# Patient Record
(demographics unavailable — no encounter records)

---

## 2024-12-30 NOTE — REVIEW OF SYSTEMS
[Joint Pain] : joint pain [Joint Stiffness] : joint stiffness [Joint Swelling] : joint swelling [Muscle Weakness] : muscle weakness [Back Pain] : back pain [Dizziness] : dizziness [Negative] : Heme/Lymph

## 2025-01-06 NOTE — PHYSICAL EXAM
[de-identified] : Telehealth precludes traditional, comprehensive physical exam. Patient appeared stable and alert.

## 2025-01-06 NOTE — HEALTH RISK ASSESSMENT
[No] : In the past 12 months have you used drugs other than those required for medical reasons? No [No falls in past year] : Patient reported no falls in the past year [Assistive Device] : Patient uses an assistive device [0] : 2) Feeling down, depressed, or hopeless: Not at all (0) [PHQ-2 Negative - No further assessment needed] : PHQ-2 Negative - No further assessment needed [With Patient/Caregiver] : , with patient/caregiver [Never] : Never [de-identified] : limited [de-identified] : ok [de-identified] : cane [[id number]] : 0 [AdvancecareDate] : 12/24 [FreeTextEntry4] : Discussed the importance of having a healthcare proxy to ensure that medical decisions are made according to the patient's wishes if they are unable to communicate. Encouraged patient to follow up with their primary care provider at the next visit to discuss this further. Patient can complete the form at the PCP office or download it from the [place] website.

## 2025-01-06 NOTE — ASSESSMENT
[FreeTextEntry1] : Educated about importance of healthy diet, physical activity, proper sleep (8 hours of sleep), importance of screening test for early detection and treatment of cancer. Importance of immunizations including COVID-19 and seasonal flu vaccine in order to prevent or lessen disease.  Patient encounter incorporated clinical review of the medical record including consultation from specialists, review of lab and diagnostic testing with interpretation and discussion of results with patient and/or primary caretaker, general patient counseling and coordination of care as well documentation update within the electronic medical record. All patient questions/concerns addressed during time of visit.   Time Based Billing: I have spent 25 minutes of time on the encounter.

## 2025-01-06 NOTE — HISTORY OF PRESENT ILLNESS
[Home] : at home, [unfilled] , at the time of the visit. [Other Location: e.g. Home (Enter Location, City,State)___] : at [unfilled] [Verbal consent obtained from patient] : the patient, [unfilled] [de-identified] : [facility] quality initiative provider note: 58-year-old female with history of multiple medical issues. Denies hospitalization, surgery, or new medical condition including COVID in the past year. Patient is being seen today to confirm diagnoses reported from [facility]. Patient report doing well. No complaints offered at this time. Awake, alert and oriented x4, in no acute distress. Denies any chest pain, shortness of breath, palpitation or dizziness. Patient verified medications and medical diagnosis. Report taking medications as prescribed.   There are no records to corroborate the diagnosis, and the patient denies any history of atherosclerosis of the native arteries of the extremities or morbid obesity. Normal DUSTIN, BLE arterial ultrasound report negative and current BMI 29.7   BS: 119 fasting A1c: 6.6 on 9/23/24

## 2025-01-06 NOTE — HISTORY OF PRESENT ILLNESS
[Home] : at home, [unfilled] , at the time of the visit. [Other Location: e.g. Home (Enter Location, City,State)___] : at [unfilled] [Verbal consent obtained from patient] : the patient, [unfilled] [de-identified] : [facility] quality initiative provider note: 58-year-old female with history of multiple medical issues. Denies hospitalization, surgery, or new medical condition including COVID in the past year. Patient is being seen today to confirm diagnoses reported from [facility]. Patient report doing well. No complaints offered at this time. Awake, alert and oriented x4, in no acute distress. Denies any chest pain, shortness of breath, palpitation or dizziness. Patient verified medications and medical diagnosis. Report taking medications as prescribed.   There are no records to corroborate the diagnosis, and the patient denies any history of atherosclerosis of the native arteries of the extremities or morbid obesity. Normal DUSTIN, BLE arterial ultrasound report negative and current BMI 29.7   BS: 119 fasting A1c: 6.6 on 9/23/24

## 2025-01-06 NOTE — HEALTH RISK ASSESSMENT
[No] : In the past 12 months have you used drugs other than those required for medical reasons? No [No falls in past year] : Patient reported no falls in the past year [Assistive Device] : Patient uses an assistive device [0] : 2) Feeling down, depressed, or hopeless: Not at all (0) [PHQ-2 Negative - No further assessment needed] : PHQ-2 Negative - No further assessment needed [With Patient/Caregiver] : , with patient/caregiver [Never] : Never [de-identified] : limited [de-identified] : ok [de-identified] : cane [[id number]] : 0 [AdvancecareDate] : 12/24 [FreeTextEntry4] : Discussed the importance of having a healthcare proxy to ensure that medical decisions are made according to the patient's wishes if they are unable to communicate. Encouraged patient to follow up with their primary care provider at the next visit to discuss this further. Patient can complete the form at the PCP office or download it from the [place] website.

## 2025-01-06 NOTE — PHYSICAL EXAM
[de-identified] : Telehealth precludes traditional, comprehensive physical exam. Patient appeared stable and alert.

## 2025-01-13 NOTE — HISTORY OF PRESENT ILLNESS
[FreeTextEntry1] : follow up  [de-identified] : 57 yo f presents to repeat labs and refills.  Here for bp check as well.  Eating well and trying to exercise more.  mentioned stressed and anxious, interested in her options  her daughter is having another baby, that will add to her stress levels and she worries about her daughter as well

## 2025-01-13 NOTE — PLAN
[FreeTextEntry1] : follow up labs, labs drawn in office   bp  stable   cont meds  cont with low salt diet and exercise  high chol, type 2 diabetes  follow up labs, drawn in office  cont meds  reviewed previous labs cont with low fat diet, low sugar/ carb diet, exercise  connecting with endo and cardio   anxiety, stress at home  connecting patient with behavioral health for further discussion   breast itch  cream has worked for patient before, will refill  if no resolution, rtc

## 2025-01-13 NOTE — HEALTH RISK ASSESSMENT
[0] : 2) Feeling down, depressed, or hopeless: Not at all (0) [PHQ-2 Negative - No further assessment needed] : PHQ-2 Negative - No further assessment needed [[id number]] : 0 [Never] : Never

## 2025-02-10 NOTE — DISCUSSION/SUMMARY
[FreeTextEntry1] : Possible abnormal rhythm check holter monitor if able to approve and schedule. CP check echo if able to approve and schedule. EKG section of the chart --- secondary to symptoms above an electrocardiogram also known as an EKG was performed.  Risks and benefits discussed with the patient. Patient was given time and privacy to changed into a gown. Shortly after, standard 10 leads were applied and a [employer] system was used to perform the study. The results were subsequently reviewed by attending physician and discussed with the patient. The study showed a normal sinus rhythm and no ST-T suggestive of ischemia. Order for the EKG was placed in the chart. The results were documented. Billing submitted. HTN we discussed inc norvasc or losartan, she feels that the increase is not warranted. Emotional support provided. HLD change to crestor, if willing to try  [EKG obtained to assist in diagnosis and management of assessed problem(s)] : EKG obtained to assist in diagnosis and management of assessed problem(s)

## 2025-02-10 NOTE — REASON FOR VISIT
[Symptom and Test Evaluation] : symptom and test evaluation [FreeTextEntry1] : 59F comes in for a follow up. Patient is hypertensive on arrival. She remarks on issues with atorvastatin, such that she thinks that the medications is causing her to have unpleasant breath.  She denied palpitations, but her blood pressure machine indicated irregular heart beat.

## 2025-02-17 NOTE — REVIEW OF SYSTEMS
[Joint Pain] : joint pain [Joint Stiffness] : joint stiffness [Joint Swelling] : joint swelling [Back Pain] : back pain [Negative] : Heme/Lymph [FreeTextEntry9] : chronic pain [de-identified] : numbness and tingling BLE

## 2025-02-17 NOTE — HISTORY OF PRESENT ILLNESS
[Home] : at home, [unfilled] , at the time of the visit. [Other Location: e.g. Home (Enter Location, City,State)___] : at [unfilled] [Telehealth (audio & video)] : This visit was provided via telehealth using real-time 2-way audio visual technology. [Verbal consent obtained from patient] : the patient, [unfilled] [de-identified] : [facility] quality initiative provider note: 59-year-old female with history of multiple medical issues. Denies hospitalization, surgery, or new medical condition including COVID in the past year. Patient is being seen today to confirm diagnoses reported from [facility]. Patient report doing well. No complaints offered at this time. Awake, alert and oriented x4, in no acute distress. Denies any chest pain, shortness of breath, palpitation or dizziness. Patient verified medications and medical diagnosis. Report taking medications as prescribed. Patient experiencing numbness and tingling BLE. She states that taking vitamin B12 seemed to provide some relief, but she was advised by her doctor to discontinue the supplement. Patient reports chronic back pain, for which she claims to be disabled. She has rods in her back and is refuse any pain medications. She is seeking help in locating a gym with a pool that is covered by insurance to aid her exercise and rehabilitation   There are no records to corroborate the diagnosis, and the patient denies any history of atherosclerosis of the native arteries of the extremities or morbid obesity. Normal DUSTIN, BLE arterial ultrasound report negative and current BMI 29.38   BS: 120 fasting A1c: 6.8 on 1/14/25

## 2025-02-17 NOTE — ASSESSMENT
[FreeTextEntry1] : Referral to [employer] for . The [employer] outreach team will follow up with patient.   Educated about importance of healthy diet, physical activity, proper sleep (8 hours of sleep), importance of screening test for early detection and treatment of cancer. Importance of immunizations including COVID-19 and seasonal flu vaccine in order to prevent or lessen disease.  Patient encounter incorporated clinical review of the medical record including consultation from specialists, review of lab and diagnostic testing with interpretation and discussion of results with patient and/or primary caretaker, general patient counseling and coordination of care as well documentation update within the electronic medical record. All patient questions/concerns addressed during time of visit.   Time Based Billing: I have spent 25 minutes of time on the encounter.

## 2025-02-17 NOTE — HEALTH RISK ASSESSMENT
[No] : In the past 12 months have you used drugs other than those required for medical reasons? No [No falls in past year] : Patient reported no falls in the past year [0] : 2) Feeling down, depressed, or hopeless: Not at all (0) [PHQ-2 Negative - No further assessment needed] : PHQ-2 Negative - No further assessment needed [[id number]] : 0 [With Patient/Caregiver] : , with patient/caregiver [AdvancecareDate] : 02/25 [FreeTextEntry4] : Discussed the importance of having a healthcare proxy to ensure that medical decisions are made according to the patient's wishes if they are unable to communicate. Encouraged patient to follow up with their primary care provider at the next visit to discuss this further. Patient can complete the form at the PCP office or download it from the [place] website. [Current] : Current

## 2025-02-17 NOTE — PHYSICAL EXAM
[de-identified] : Telehealth precludes traditional, comprehensive physical exam. Patient appeared stable and alert.

## 2025-03-20 NOTE — REVIEW OF SYSTEMS
[Fatigue] : no fatigue [Recent Weight Gain (___ Lbs)] : no recent weight gain [Recent Weight Loss (___ Lbs)] : no recent weight loss [Fever] : no fever [Chest Pain] : no chest pain [Shortness Of Breath] : no shortness of breath [Nausea] : no nausea [Vomiting] : no vomiting

## 2025-03-20 NOTE — PHYSICAL EXAM
[Alert] : alert [Well Nourished] : well nourished [No Acute Distress] : no acute distress [Well Developed] : well developed [Normal Sclera/Conjunctiva] : normal sclera/conjunctiva [EOMI] : extra ocular movement intact [No Proptosis] : no proptosis [Normal Oropharynx] : the oropharynx was normal [Thyroid Not Enlarged] : the thyroid was not enlarged [No Thyroid Nodules] : no palpable thyroid nodules [No Respiratory Distress] : no respiratory distress [No Accessory Muscle Use] : no accessory muscle use [Clear to Auscultation] : lungs were clear to auscultation bilaterally [Normal S1, S2] : normal S1 and S2 [Normal Rate] : heart rate was normal [Regular Rhythm] : with a regular rhythm [No Edema] : no peripheral edema [Pedal Pulses Normal] : the pedal pulses are present [Normal Bowel Sounds] : normal bowel sounds [Not Tender] : non-tender [Not Distended] : not distended [Soft] : abdomen soft [No Stigmata of Cushings Syndrome] : no stigmata of Cushings Syndrome [Normal Gait] : normal gait [Normal Strength/Tone] : muscle strength and tone were normal [No Rash] : no rash [Normal] : normal [Full ROM] : with full range of motion [Oriented x3] : oriented to person, place, and time [Diminished Throughout Both Feet] : normal tactile sensation with monofilament testing throughout both feet

## 2025-03-20 NOTE — HISTORY OF PRESENT ILLNESS
[FreeTextEntry1] : Ms. Bal is a 59-years-old woman with type 2 diabetes mellitus, hypertension, hyperlipidemia, asthma who presents to the clinic to establish care.  DIABETES HISTORY - Age at diagnosis: 50 years old. - Symptoms at the time of diagnosis: She had a lumbar spine surgery around that time; after taking medications during the surgery, she was later told that she had diabetes.  - Current Therapy: Januvia 25 mg daily. - History of other regimens: Metformin (Vomiting, dizziness). - History of hypoglycemia: Denies. - History of DKA/HHS: Denies. - Complications: Seen by ophthalmologist last year, no diabetic retinopathy. She has neuropathy symptoms but likely related to back surgeries. - Home FSG: She is interested in obtaining a continuous glucose sensor since she feels tired of pricking her fingers to test for her glucose levels.        > Fastin-120s mg/dL.       > Highest number she has seen on random glucose check the rest of the day has been ~150 mg/dL - Diet: Follows with a dietitian who has been helping her adjust her diet       > Breakfast: Cinamon tea, one slice of toast, egg with spinach and avocado.        > Lunch: Most of the time she skips; sometimes may have a piece of cake or bun or biscuit.       > Dinner: Bulgur wheat with fish or chicken, or turkey or shrimp and vegetables.       > Snacks: Denies.       > No juice or soda

## 2025-03-20 NOTE — ASSESSMENT
[FreeTextEntry1] : 1. Type 2 Diabetes Mellitus - Currently well-controlled with a hemoglobin A1c of 6.5%. - Home glucose monitoring: Fasting blood glucose readings range from 108-120 mg/dL. Highest reading ever recorded was 150 mg/dL in the afternoon. - Continue Januvia 25 mg daily. - Advised to check blood glucose once daily in the morning before breakfast. Discussed continuous glucose monitoring (CGM) options, including Stello and Lingo CGM, as over-the-counter alternatives if she desires more frequent monitoring, although I don't believe this may be medically necessary at this time.  Explained that insurance/Medicare would not cover CGM given she is not on insulin. Provided cost information for Stello (approximately $90-$100 per month) and Lingo ($89 per month or $249 for a 3-month supply). Advised patient to order through the respective websites for home delivery. - Dietary counseling provided: Advised on balanced meal composition (half plate vegetables, quarter plate protein, quarter plate starch). Discussed the impact of carbohydrates on blood sugar, including fruits and juices. Recommended consuming whole fruits instead of juices.  Cautioned about high sugar content in pineapple, grapes, and jason. Encouraged consumption of berries (raspberries, blackberries) as lower-sugar alternatives.  Ms. Bal reports she often skips lunch and snacks on pastries, cake, or biscuits.  She also reports making and drinking fresh fruit juice.  Counseled on the importance of regular, balanced meals and avoiding sugary snacks and juices.   - She is scheduled for a repeat annual retinopathy screening. Check urine ACR. Monofilament within normal limits today (3/20/25).   2. Hyperlipidemia - LDL 94 mg/dL.  - Continue atorvastatin 20 mg nightly.  - Diet and lifestyle modification advised.   Follow-up: Reassess in 4 months.

## 2025-03-20 NOTE — ADDENDUM
[FreeTextEntry1] : Time-Based Billing: I have spent 45 minutes on the encounter. This includes time spent with the patient during the visit as well as time spent before and after the visit reviewing the chart, documenting the encounter, reviewing studies, etc.

## 2025-05-12 NOTE — PLAN
[FreeTextEntry1] : follow up labs, labs drawn in office   bp  stable   cont meds  cont with low salt diet and exercise  high chol, type 2 diabetes  follow up labs, drawn in office  cont meds  reviewed previous labs cont with low fat diet, low sugar/ carb diet, exercise  connecting with endo and cardio

## 2025-05-12 NOTE — HISTORY OF PRESENT ILLNESS
[FreeTextEntry1] : follow up  [de-identified] : 58 yo f presents to repeat labs and refills.  Here for bp check as well.  Eating well and trying to exercise more.

## 2025-05-16 NOTE — REASON FOR VISIT
[Symptom and Test Evaluation] : symptom and test evaluation [FreeTextEntry1] : 59F comes in for a re-evaluation. She stopped her statin. Blood pressure is also a little elevated.  Occasional leg fatigue noted. This is often noted with activity. Symptoms always improve at rest.

## 2025-05-16 NOTE — DISCUSSION/SUMMARY
[FreeTextEntry1] : Leg fatigue DUSTIN and doppler if able to approve and schedule. HLD try crestor low dose HTN will cont meds and hold off on making adjustment for now Carotid disease minor disease noted on exam; will manage medically with current medications and re-evaluate lipids in 3-6 months

## 2025-07-07 NOTE — PHYSICAL EXAM
[Alert] : alert [Well Nourished] : well nourished [No Acute Distress] : no acute distress [Normal Sclera/Conjunctiva] : normal sclera/conjunctiva [No Proptosis] : no proptosis [Thyroid Not Enlarged] : the thyroid was not enlarged [No Thyroid Nodules] : no palpable thyroid nodules [No Respiratory Distress] : no respiratory distress [No Accessory Muscle Use] : no accessory muscle use [Clear to Auscultation] : lungs were clear to auscultation bilaterally [Normal S1, S2] : normal S1 and S2 [Normal Rate] : heart rate was normal [Regular Rhythm] : with a regular rhythm [No Edema] : no peripheral edema [Normal Bowel Sounds] : normal bowel sounds [Not Tender] : non-tender [Not Distended] : not distended [Soft] : abdomen soft [Oriented x3] : oriented to person, place, and time

## 2025-07-07 NOTE — HISTORY OF PRESENT ILLNESS
[FreeTextEntry1] : Ms. Bal is a 59-years-old woman with type 2 diabetes mellitus, hypertension, hyperlipidemia, asthma who presents to the clinic for follow up.   DIABETES HISTORY - Age at diagnosis: 50 years old. - Symptoms at the time of diagnosis: She had a lumbar spine surgery around that time; after taking medications during the surgery, she was later told that she had diabetes.  - Current Therapy: Januvia 25 mg daily. - History of other regimens: Metformin (Vomiting, dizziness). - History of hypoglycemia: Denies. - History of DKA/HHS: Denies. - Complications: Seen by ophthalmologist last year, no diabetic retinopathy. She has neuropathy symptoms but likely related to back surgeries. - Home FSG: She is interested in obtaining a continuous glucose sensor since she feels tired of pricking her fingers to test for her glucose levels.        > Fastin-120s mg/dL.       > Highest number she has seen on random glucose check the rest of the day has been ~150 mg/dL - Diet: Follows with a dietitian who has been helping her adjust her diet       > Breakfast: Cinamon tea, one slice of toast, egg with spinach and avocado.        > Lunch: Most of the time she skips; sometimes may have a piece of cake or bun or biscuit.       > Dinner: Bulgur wheat with fish or chicken, or turkey or shrimp and vegetables.       > Snacks: Denies.       > No juice or soda  25 - Ms. Bal presents for a follow-up appointment regarding her diabetes and hypercholesterolemia management. She requests a refill for Januvia. Her cardiologist changed her cholesterol medication in May from atorvastatin to rosuvastatin, as she was not taking atorvastatin consistently due to concerns about the medication causing bad breath. She has been taking the rosuvastatin once daily consistently since then. She denies any issues with the current medication regimen. Her A1C was 6.6% one month ago. She has an upcoming ophthalmology appointment on 2025. Her last eye examination was one year ago, reportedly without retinopathy. Her last podiatry appointment was two weeks ago, and she was informed that everything was fine. Her monofilament test was checked during her last visit here in March and was found to be normal.

## 2025-07-07 NOTE — ASSESSMENT
[FreeTextEntry1] : 1. Type 2 Diabetes Mellitus, controlled - The diabetes is well-controlled, as evidenced by the recent HbA1c of 6.6%. The goal is to maintain HbA1c below 7.0%.  - CONTINUE Januvia 25 mg daily.  - Microvascular complications:     > Neuropathy: Monofilament was within normal limits (3/2025).     > Nephropathy: Last albumin/creatinine ratio was normal (3/2025).      > Retinopathy: Last dilated eye exam on 7/2024, no retinopathy. Has upcoming appointment next week.  2. Hyperlipidemia - The patient was noted to have increased LDL levels after discontinuing atorvastatin. She reports having stopped the medication due to concerns about it causing bad breath. She was switched to rosuvastatin at a dosage of 10 mg daily less than one month ago and has been taking it consistently as prescribed. - Continue rosuvastatin 10 mg nightly.  - Diet and lifestyle modification advised.   RTC in 6 months